# Patient Record
Sex: MALE | Race: OTHER | NOT HISPANIC OR LATINO | URBAN - METROPOLITAN AREA
[De-identification: names, ages, dates, MRNs, and addresses within clinical notes are randomized per-mention and may not be internally consistent; named-entity substitution may affect disease eponyms.]

---

## 2020-03-25 ENCOUNTER — EMERGENCY (EMERGENCY)
Facility: HOSPITAL | Age: 45
LOS: 1 days | Discharge: HOME | End: 2020-03-25
Admitting: STUDENT IN AN ORGANIZED HEALTH CARE EDUCATION/TRAINING PROGRAM
Payer: COMMERCIAL

## 2020-03-25 VITALS
TEMPERATURE: 99 F | HEART RATE: 90 BPM | OXYGEN SATURATION: 98 % | SYSTOLIC BLOOD PRESSURE: 118 MMHG | RESPIRATION RATE: 16 BRPM | DIASTOLIC BLOOD PRESSURE: 73 MMHG

## 2020-03-25 VITALS
HEART RATE: 111 BPM | SYSTOLIC BLOOD PRESSURE: 128 MMHG | OXYGEN SATURATION: 96 % | RESPIRATION RATE: 18 BRPM | TEMPERATURE: 98 F | DIASTOLIC BLOOD PRESSURE: 80 MMHG

## 2020-03-25 PROCEDURE — 71045 X-RAY EXAM CHEST 1 VIEW: CPT | Mod: 26

## 2020-03-25 PROCEDURE — 99285 EMERGENCY DEPT VISIT HI MDM: CPT

## 2020-03-25 RX ORDER — ACETAMINOPHEN 500 MG
650 TABLET ORAL ONCE
Refills: 0 | Status: COMPLETED | OUTPATIENT
Start: 2020-03-25 | End: 2020-03-25

## 2020-03-25 RX ORDER — ACETAMINOPHEN 500 MG
325 TABLET ORAL ONCE
Refills: 0 | Status: COMPLETED | OUTPATIENT
Start: 2020-03-25 | End: 2020-03-25

## 2020-03-25 RX ORDER — IBUPROFEN 200 MG
600 TABLET ORAL ONCE
Refills: 0 | Status: COMPLETED | OUTPATIENT
Start: 2020-03-25 | End: 2020-03-25

## 2020-03-25 RX ADMIN — Medication 600 MILLIGRAM(S): at 16:59

## 2020-03-25 RX ADMIN — Medication 325 MILLIGRAM(S): at 16:59

## 2020-03-25 RX ADMIN — Medication 650 MILLIGRAM(S): at 15:42

## 2020-03-25 NOTE — ED PROVIDER NOTE - CLINICAL SUMMARY MEDICAL DECISION MAKING FREE TEXT BOX
46 y/o M w/ PMH DM and HTN presents to the ED c/o 1 week of fever, chills and cough. Pt is well appearing and in NAD. Will obtain chest x-ray. Will give covid-19 instructions and strict return precautions. 44 y/o Male with a PMHx of DM and HTN presents to the ER c/o 1 week of fever, chills and cough. Pt states he tested positive for covid-19 this past Thursday. Pt is well appearing and in NAD. Will obtain chest x-ray. Will give covid-19 instructions and strict return precautions.

## 2020-03-25 NOTE — ED PROVIDER NOTE - CHPI ED SYMPTOMS NEG
no chest pain, no weakness, no dizziness, no nausea/no abdominal pain/no shortness of breath/no vomiting no abdominal pain/no shortness of breath/no vomiting

## 2020-03-25 NOTE — ED ADULT NURSE NOTE - CHIEF COMPLAINT QUOTE
Pt is COVID + is having increased difficulty breathing; febrile with dry cough; breathing regular and unlabored at this time; intermittent cough

## 2020-03-25 NOTE — ED PROVIDER NOTE - PROGRESS NOTE DETAILS
OFELIA Blake: spoke with pt's PMD Dr Fernando advised of results and that pt will be discharged home.  Pt VS improved, pt ambulatory without difficulty.  Pt given strict return precautions. OFELIA Blake: spoke with pt's PMD Dr Fernando advised of results and that pt will be discharged home.  Pt ambulatory without difficulty.  Pt given strict return precautions.

## 2020-03-25 NOTE — ED PROVIDER NOTE - OBJECTIVE STATEMENT
46 y/o M w/ PMH DM and HTN presents to the ED c/o 1 week of fever, chills and cough. Pt states he tested positive for covid-19 this past Thursday. 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 Pt reports fever. Last fever was this morning (TMAX 101), alleviated with Tylenol. Denies any chest pain, shortness of breath, weakness, dizziness, nausea, vomiting or abdominal pain. Pt states he called his PMD and was advised to go to the ER for further evaluation. 44 y/o Male with a PMHx of DM and HTN presents to the ER c/o 1 week of fever, chills and cough. Pt states he tested positive for covid-19 this past Thursday. 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  Last fever was this morning (TMAX 101), alleviated with Tylenol.  Pt denies chest pain, shortness of breath, weakness, dizziness, nausea, vomiting or abdominal pain. Pt states he called his PMD and was advised to go to the ER for further evaluation.

## 2020-03-25 NOTE — ED ADULT TRIAGE NOTE - CHIEF COMPLAINT QUOTE
Pt is COVID + is having increased difficulty breathing; febrile with dry cough Pt is COVID + is having increased difficulty breathing; febrile with dry cough; breathing regular and unlabored at this time; intermittent cough

## 2020-03-25 NOTE — ED PROVIDER NOTE - NSFOLLOWUPINSTRUCTIONS_ED_ALL_ED_FT
Follow up with your Primary Medical Doctor.  Rest.  Drink plenty of fluids.  Take Tylenol 650mg orally every 6 hours as needed for fever.  Self quarantine as discussed for the next 14 days.  It is very important to be diligent about hand washing & hygiene to avoid spreading the illness to others.  Return to the ER for any persistent/worsening or new symptoms chest pain, shortness of breath, unable to eat/drink high fever or any concerning symptoms.  See attached COVID pamphlet.

## 2020-03-25 NOTE — ED PROVIDER NOTE - PATIENT PORTAL LINK FT
You can access the FollowMyHealth Patient Portal offered by [facility] by registering at the following website: [email or web address]. By joining [email or web address]’s FollowMyHealth portal, you will also be able to view your health information using other applications (apps) compatible with our system.

## 2020-03-27 ENCOUNTER — EMERGENCY (EMERGENCY)
Facility: HOSPITAL | Age: 45
LOS: 1 days | Discharge: LEFT BEFORE DISCHARGE | End: 2020-03-27
Admitting: EMERGENCY MEDICINE
Payer: COMMERCIAL

## 2020-03-27 VITALS
OXYGEN SATURATION: 100 % | TEMPERATURE: 101 F | RESPIRATION RATE: 20 BRPM | SYSTOLIC BLOOD PRESSURE: 127 MMHG | HEART RATE: 95 BPM | DIASTOLIC BLOOD PRESSURE: 81 MMHG

## 2020-03-27 VITALS
DIASTOLIC BLOOD PRESSURE: 87 MMHG | OXYGEN SATURATION: 96 % | HEART RATE: 102 BPM | TEMPERATURE: 100 F | RESPIRATION RATE: 26 BRPM | SYSTOLIC BLOOD PRESSURE: 131 MMHG

## 2020-03-27 PROCEDURE — L9991: CPT

## 2020-03-27 NOTE — ED ADULT TRIAGE NOTE - CHIEF COMPLAINT QUOTE
Pt was discharged yesterday for fever, cough, and SOB x 10 days.  He now states that symptoms have worsened.

## 2020-03-28 PROBLEM — I10 ESSENTIAL (PRIMARY) HYPERTENSION: Chronic | Status: ACTIVE | Noted: 2020-03-26

## 2020-03-28 PROBLEM — E11.9 TYPE 2 DIABETES MELLITUS WITHOUT COMPLICATIONS: Chronic | Status: ACTIVE | Noted: 2020-03-26

## 2022-08-23 ENCOUNTER — APPOINTMENT (OUTPATIENT)
Dept: PODIATRY | Facility: CLINIC | Age: 47
End: 2022-08-23

## 2022-08-23 VITALS — WEIGHT: 205 LBS | HEIGHT: 70 IN | BODY MASS INDEX: 29.35 KG/M2

## 2022-08-23 DIAGNOSIS — S92.514A NONDISPLACED FRACTURE OF PROXIMAL PHALANX OF RIGHT LESSER TOE(S), INITIAL ENCOUNTER FOR CLOSED FRACTURE: ICD-10-CM

## 2022-08-23 DIAGNOSIS — L85.1 ACQUIRED KERATOSIS [KERATODERMA] PALMARIS ET PLANTARIS: ICD-10-CM

## 2022-08-23 DIAGNOSIS — M10.9 GOUT, UNSPECIFIED: ICD-10-CM

## 2022-08-23 DIAGNOSIS — Z88.9 ALLERGY STATUS TO UNSPECIFIED DRUGS, MEDICAMENTS AND BIOLOGICAL SUBSTANCES: ICD-10-CM

## 2022-08-23 DIAGNOSIS — E11.42 TYPE 2 DIABETES MELLITUS WITH DIABETIC POLYNEUROPATHY: ICD-10-CM

## 2022-08-23 PROCEDURE — 73630 X-RAY EXAM OF FOOT: CPT | Mod: RT

## 2022-08-23 PROCEDURE — 28510 TREATMENT OF TOE FRACTURE: CPT | Mod: T6

## 2022-08-23 PROCEDURE — 99202 OFFICE O/P NEW SF 15 MIN: CPT | Mod: 57

## 2022-08-25 NOTE — REASON FOR VISIT
[Other:___] : [unfilled] [Initial Visit] : an initial visit for [Confirmed Foot Fracture] : confirmed foot fracture [FreeTextEntry2] : Right 2nd toe

## 2022-08-25 NOTE — PHYSICAL EXAM
[General Appearance - Alert] : alert [General Appearance - In No Acute Distress] : in no acute distress [General Appearance - Well Nourished] : well nourished [General Appearance - Well Developed] : well developed [General Appearance - Well-Appearing] : healthy appearing [Ankle Swelling On The Right] : of the right ankle [Varicose Veins Of The Right Leg] : on the right foot/ankle [Alex's Test For Right Radial Artery Patency] : positive right [Skin Color & Pigmentation] : normal skin color and pigmentation [Skin Turgor] : normal skin turgor [Skin Lesions] : no skin lesions [Sensation] : the sensory exam was normal to light touch and pinprick [2+] : left foot dorsalis pedis 2+ [No Focal Deficits] : no focal deficits [Deep Tendon Reflexes (DTR)] : deep tendon reflexes were 2+ and symmetric [Motor Exam] : the motor exam was normal [Vibration Dec.] : diminished vibratory sensation at the level of the toes [Position Sense Dec.] : diminished position sense at the level of the toes [Ankle Swelling (On Exam)] : not present [Varicose Veins Of Lower Extremities] : not present [] : not present [Delayed in the Right Toes] : capillary refills normal in right toes [FreeTextEntry3] : within normal limits [de-identified] : Pain at the head of the proximal phalanx, right 2nd toe with swelling.  Pain on attempted ROM.   [Foot Ulcer] : no foot ulcer [Skin Induration] : no skin induration [Diminished Throughout Right Foot] : normal sensation with monofilament testing throughout right foot [Diminished Throughout Left Foot] : normal sensation with monofilament testing throughout left foot

## 2022-08-25 NOTE — PROCEDURE
[FreeTextEntry1] : X-rays: (3 views - right 2nd toe) Non-displaced fracture, sub-capital at the proximal phalanx, right 2nd toe.  \par \par Impression: Fracture, right 2nd toe.\par \par Treatment: The toe was cynthia splinted.  He is to wear an open shoe.  Will reevaluate in 2 weeks.  Any questions or problems, patient is to contact the office.\par

## 2022-08-25 NOTE — HISTORY OF PRESENT ILLNESS
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  He presents with pain and swelling of the right 2nd toe.  Patient is a diabetic.  A1c: 7.8%.  He last saw Dr. Honeycutt on 07/27/22.

## 2022-08-25 NOTE — CONSULT LETTER
[Consult Letter:] : I had the pleasure of evaluating your patient, [unfilled]. [Please see my note below.] : Please see my note below. [Consult Closing:] : Thank you very much for allowing me to participate in the care of this patient.  If you have any questions, please do not hesitate to contact me. [Sincerely,] : Sincerely, [FreeTextEntry2] : Andrew Honeycutt MD\par [id number] [facility]\[place] [address] [FreeTextEntry3] : Charles Lombardi, DPM

## 2025-03-14 NOTE — ED PROVIDER NOTE - CCCP TRG CHIEF CMPLNT
Last Office Visit  -  01/27/2025  Next Office Visit  -  n/a    Last Filled  -    Last UDS -    Contract -     fever/cough